# Patient Record
(demographics unavailable — no encounter records)

---

## 2025-05-06 NOTE — DISCUSSION/SUMMARY
[FreeTextEntry1] : 1. CAD: s/p MI and PCI (2007-[facility]) with subsequent in-stent restenosis in 2008, s/p PCI, according to patient. Patient having worsening dyspnea on exertion and declining nuclear stress testing. He would rather have a left heart catheterization. Discussed benefits and risks of procedure and wishes to proceed. Recommend echocardiogram. Fow now, continue Plavix 75mg daily, simvastatin 10mg nightly, Lopressor 25mg BID.  2. HTN: elevated. Repeat BP by myself 140/76. Goal BP less than 130/80. Recommend low salt diet. For now continue lisinopril 10mg daily. Will attempt to obtain most recent labs from PCP.  3. HLD: for now continue simvastatin 10mg nightly. Will attempt to obtain most recent labs from PCP.  Recommend abdominal aortic duplex to screen for AAA in this former smoker  Follow up with me after testing.

## 2025-05-06 NOTE — PHYSICAL EXAM
[Normal] : moves all extremities, no focal deficits, normal speech [de-identified] :  No carotid bruits auscultated bilaterally.

## 2025-05-06 NOTE — HISTORY OF PRESENT ILLNESS
[FreeTextEntry1] : 69 year old male, former smoker (quit 2007), CAD s/p MI and PCI (2007-[facility]) with subsequent in-stent restenosis in 2008, s/p PCI, according to patient, presents to establish care with a cardiologist. States he last saw a cardiologist, circa [phone]. He has been having dyspnea on exertion. He normally is able to walk around the parking lot at [place] twice, but recently, gets winded after the first lap. No chest pain or pressure.

## 2025-05-30 NOTE — PHYSICAL EXAM
[Normal] : moves all extremities, no focal deficits, normal speech [de-identified] :  No carotid bruits auscultated bilaterally.

## 2025-05-30 NOTE — HISTORY OF PRESENT ILLNESS
[FreeTextEntry1] : 69 year old male, former smoker (quit 2007), CAD s/p MI and PCI (2007-[facility]) with subsequent in-stent restenosis in 2008, s/p PCI, according to patient, presents to establish care with a cardiologist. States he last saw a cardiologist, circa [phone]. He has been having dyspnea on exertion. He normally is able to walk around the parking lot at [place] twice, but recently, gets winded after the first lap. No chest pain or pressure.

## 2025-05-30 NOTE — DISCUSSION/SUMMARY
[FreeTextEntry1] : 1. CAD: s/p MI and PCI (2007-[facility]) with subsequent in-stent restenosis in 2008, s/p PCI, according to patient. Patient having worsening dyspnea on exertion and declining nuclear stress testing. He would rather have a left heart catheterization. Discussed benefits and risks of procedure and wishes to proceed. Recommend echocardiogram. Fow now, continue Plavix 75mg daily, simvastatin 40mg nightly, Lopressor 25mg BID.  2. HTN: Goal BP less than 130/80. Recommend low salt diet. Continue lisinopril 10mg daily.   3. HLD: for now continue simvastatin 40mg nightly. Will attempt to obtain most recent labs from PCP.  Follow up in 6 months. Pending cardiac catheterization

## 2025-05-30 NOTE — CARDIOLOGY SUMMARY
[de-identified] : 4/22/2025, NSR with poor R wave progression [de-identified] : 5/29/2025, LV EF 55%, trace MR, mild AI [de-identified] : 5/29/2025, Abdominal Aortic Duplex: no AAA